# Patient Record
Sex: MALE | Race: WHITE | HISPANIC OR LATINO | Employment: OTHER | URBAN - METROPOLITAN AREA
[De-identification: names, ages, dates, MRNs, and addresses within clinical notes are randomized per-mention and may not be internally consistent; named-entity substitution may affect disease eponyms.]

---

## 2022-08-01 ENCOUNTER — APPOINTMENT (OUTPATIENT)
Dept: GENERAL RADIOLOGY | Facility: HOSPITAL | Age: 27
End: 2022-08-01

## 2022-08-01 ENCOUNTER — HOSPITAL ENCOUNTER (EMERGENCY)
Facility: HOSPITAL | Age: 27
Discharge: HOME | End: 2022-08-01
Attending: EMERGENCY MEDICINE | Admitting: EMERGENCY MEDICINE

## 2022-08-01 VITALS
BODY MASS INDEX: 22.2 KG/M2 | DIASTOLIC BLOOD PRESSURE: 78 MMHG | HEIGHT: 64 IN | OXYGEN SATURATION: 99 % | HEART RATE: 65 BPM | TEMPERATURE: 99.1 F | RESPIRATION RATE: 16 BRPM | WEIGHT: 130 LBS | SYSTOLIC BLOOD PRESSURE: 137 MMHG

## 2022-08-01 DIAGNOSIS — S61.011A LACERATION OF RIGHT THUMB WITHOUT FOREIGN BODY WITHOUT DAMAGE TO NAIL, INITIAL ENCOUNTER: Primary | ICD-10-CM

## 2022-08-01 PROCEDURE — 99283 EMERGENCY DEPT VISIT LOW MDM: CPT

## 2022-08-01 PROCEDURE — 73140 X-RAY EXAM OF FINGER(S): CPT

## 2022-08-01 PROCEDURE — 90471 IMMUNIZATION ADMIN: CPT

## 2022-08-01 PROCEDURE — 25010000002 TETANUS-DIPHTH-ACELL PERTUSSIS 5-2.5-18.5 LF-MCG/0.5 SUSPENSION PREFILLED SYRINGE

## 2022-08-01 PROCEDURE — 90715 TDAP VACCINE 7 YRS/> IM: CPT

## 2022-08-01 RX ORDER — BACITRACIN ZINC 500 [USP'U]/G
1 OINTMENT TOPICAL ONCE
Status: COMPLETED | OUTPATIENT
Start: 2022-08-01 | End: 2022-08-01

## 2022-08-01 RX ORDER — BACITRACIN, NEOMYCIN, POLYMYXIN B 400; 3.5; 5 [USP'U]/G; MG/G; [USP'U]/G
1 OINTMENT TOPICAL 2 TIMES DAILY
Qty: 14 G | Refills: 0 | Status: SHIPPED | OUTPATIENT
Start: 2022-08-01 | End: 2022-08-08

## 2022-08-01 RX ORDER — LIDOCAINE HYDROCHLORIDE 10 MG/ML
5 INJECTION, SOLUTION EPIDURAL; INFILTRATION; INTRACAUDAL; PERINEURAL ONCE
Status: COMPLETED | OUTPATIENT
Start: 2022-08-01 | End: 2022-08-01

## 2022-08-01 RX ORDER — CEPHALEXIN 500 MG/1
500 CAPSULE ORAL ONCE
Status: COMPLETED | OUTPATIENT
Start: 2022-08-01 | End: 2022-08-01

## 2022-08-01 RX ORDER — CEPHALEXIN 500 MG/1
500 CAPSULE ORAL 2 TIMES DAILY
Qty: 14 CAPSULE | Refills: 0 | Status: SHIPPED | OUTPATIENT
Start: 2022-08-01 | End: 2022-08-08

## 2022-08-01 RX ADMIN — TETANUS TOXOID, REDUCED DIPHTHERIA TOXOID AND ACELLULAR PERTUSSIS VACCINE, ADSORBED 0.5 ML: 5; 2.5; 8; 8; 2.5 SUSPENSION INTRAMUSCULAR at 18:16

## 2022-08-01 RX ADMIN — CEPHALEXIN 500 MG: 500 CAPSULE ORAL at 19:30

## 2022-08-01 RX ADMIN — LIDOCAINE HYDROCHLORIDE 5 ML: 10 INJECTION, SOLUTION EPIDURAL; INFILTRATION; INTRACAUDAL; PERINEURAL at 18:17

## 2022-08-01 RX ADMIN — BACITRACIN ZINC 1 APPLICATION: 500 OINTMENT TOPICAL at 19:31

## 2022-08-01 NOTE — ED PROVIDER NOTES
EMERGENCY DEPARTMENT ENCOUNTER      Room Number: 14/14    History is provided by the patient, translation services needed    HPI:    Chief complaint: Laceration    Location: Right thumb    Quality/Severity:  10/10    Timing/Duration: 1 hour prior to arrival    Modifying Factors: None    Associated Symptoms: None    Narrative: Pt is a 26 y.o. male who presents complaining of a laceration to his right thumb x1 hour prior to arrival.  Patient advises that he cut his thumb with a pocket knife.  He is unsure if he is up-to-date on his tetanus vaccine.  Patient denies any other injuries.      PMD: Provider, No Known    REVIEW OF SYSTEMS  Review of Systems   Constitutional: Negative for fever.   HENT: Negative for congestion.    Respiratory: Negative for cough and shortness of breath.    Cardiovascular: Negative for chest pain.   Gastrointestinal: Negative for abdominal pain, nausea and vomiting.   Musculoskeletal: Negative for gait problem.   Skin: Positive for wound (Laceration to right thumb). Negative for color change, pallor and rash.   Neurological: Negative for dizziness, weakness and headaches.   Psychiatric/Behavioral: Negative for confusion. The patient is not nervous/anxious.          PAST MEDICAL HISTORY  Active Ambulatory Problems     Diagnosis Date Noted   • No Active Ambulatory Problems     Resolved Ambulatory Problems     Diagnosis Date Noted   • No Resolved Ambulatory Problems     No Additional Past Medical History       PAST SURGICAL HISTORY  No past surgical history on file.    FAMILY HISTORY  No family history on file.    SOCIAL HISTORY  Social History     Socioeconomic History   • Marital status: Single       ALLERGIES  Patient has no known allergies.      Current Facility-Administered Medications:   •  bacitracin ointment 1 application, 1 application, Topical, Once, Justa Juarez PA-C  •  cephalexin (KEFLEX) capsule 500 mg, 500 mg, Oral, Once, Justa Juarez PA-C    Current Outpatient Medications:    •  cephalexin (KEFLEX) 500 MG capsule, Take 1 capsule by mouth 2 (Two) Times a Day for 7 days., Disp: 14 capsule, Rfl: 0  •  neomycin-bacitracin-polymyxin (NEOSPORIN) 400-5-5000 ointment, Apply 1 application topically to the appropriate area as directed 2 (Two) Times a Day for 7 days., Disp: 14 g, Rfl: 0    PHYSICAL EXAM  ED Triage Vitals [08/01/22 1745]   Temp Heart Rate Resp BP SpO2   99.1 °F (37.3 °C) 65 16 137/78 99 %      Temp src Heart Rate Source Patient Position BP Location FiO2 (%)   -- -- -- -- --       Physical Exam  Vitals and nursing note reviewed.   Constitutional:       General: He is not in acute distress.     Appearance: Normal appearance. He is normal weight. He is not ill-appearing, toxic-appearing or diaphoretic.   HENT:      Head: Normocephalic and atraumatic.   Eyes:      Extraocular Movements: Extraocular movements intact.      Conjunctiva/sclera: Conjunctivae normal.      Pupils: Pupils are equal, round, and reactive to light.   Cardiovascular:      Rate and Rhythm: Normal rate and regular rhythm.      Heart sounds: Normal heart sounds.   Pulmonary:      Effort: Pulmonary effort is normal. No respiratory distress.      Breath sounds: Normal breath sounds.   Abdominal:      General: Bowel sounds are normal. There is no distension.      Palpations: Abdomen is soft.      Tenderness: There is no abdominal tenderness.   Musculoskeletal:         General: Tenderness (To right thumb) and signs of injury (Laceration to right thumb) present. No swelling or deformity. Normal range of motion.      Cervical back: Normal range of motion and neck supple.      Right lower leg: No edema.      Left lower leg: No edema.   Skin:     General: Skin is warm and dry.      Coloration: Skin is not jaundiced or pale.      Findings: No bruising, erythema, lesion or rash.   Neurological:      Mental Status: He is alert and oriented to person, place, and time.   Psychiatric:         Mood and Affect: Mood and affect  normal.           LAB RESULTS  Lab Results (last 24 hours)     ** No results found for the last 24 hours. **            RADIOLOGY  XR Finger 2+ View Right    Result Date: 8/1/2022  CR Fingers Min 2 Vws RT INDICATION: Laceration injury to tip of thumb COMPARISON: None available. FINDINGS: PA, lateral, and oblique views of the right thumb..  No fracture or dislocation.  No bone erosion or destruction.  No foreign body. Soft tissue defect at the tip of the thumb.     No acute bony injury or radiopaque foreign body at the level of the thumb. Soft tissue defect at the tip of the thumb consistent with laceration injury. Signer Name: HITESH SCHULTE MD  Signed: 8/1/2022 6:44 PM  Workstation Name: [facility]  [facility]      I ordered the above radiologic testing and reviewed the results    PROCEDURES  Procedures      PROGRESS AND CONSULTS  ED Course as of 08/01/22 1923   Mon Aug 01, 2022   1923 Laceration repair:  Discussed risks to include bleeding, infection, further tissue damage, benefits to include improved wound healing, and alternatives to include no closure, altered closure techniques with patient/parents of the patient/guardian.  Expressed verbal consent for procedure.  1.5 cm laceration located on the right thumb.  Wound is anesthetized with lidocaine 1%, cleansed with chlorhexidine, and irrigated copiously.  Wound explored.  Simple single layer laceration closed with 5-0 Ethilon in an interrupted fashion requiring 4 sutures.  Well-tolerated.  No immediate complications identified.  Reviewed wound care, follow-up for suture removal, and red flags which would indicate need for reevaluation of the wound.    [AH]      ED Course User Index  [AH] Justa Juarez PA-C           MEDICAL DECISION MAKING    MDM     My differential diagnosis of the upper extremity pain or injury includes but is not limited to contusions of the shoulder, forearm, arm, wrist, elbow or hand, dislocations of shoulder,  elbow, wrist, digits, nursemaid's elbow (age-appropriate), shoulder sprain, elbow sprain, wrist sprain, digit sprain, shoulder strain, arm strain, forearm strain, elbow strain, wrist strain, hand sprain, digit strain, lacerations of the upper extremity, fractures both closed and open of clavicle, scapula, humerus, radius, ulna, bones of the wrist, hands and digits, cellulitis or abscess, cervical radiculopathy, radial nerve palsy, neurogenic upper extremity pain, compartment syndrome.  DIAGNOSIS  Final diagnoses:   Laceration of right thumb without foreign body without damage to nail, initial encounter       Latest Documented Vital Signs:  As of 19:23 EDT  BP- 137/78 HR- 65 Temp- 99.1 °F (37.3 °C) O2 sat- 99%    DISPOSITION  Pt discharged    Discussed pertinent findings with the patient/family.  Patient/Family voiced understanding of need to follow-up for recheck and further testing as needed.  Return to the Emergency Department warnings were given.         Medication List      New Prescriptions    cephalexin 500 MG capsule  Commonly known as: KEFLEX  Take 1 capsule by mouth 2 (Two) Times a Day for 7 days.     neomycin-bacitracin-polymyxin 400-5-5000 ointment  Commonly known as: NEOSPORIN  Apply 1 application topically to the appropriate area as directed 2 (Two) Times a Day for 7 days.           Where to Get Your Medications      You can get these medications from any pharmacy    Bring a paper prescription for each of these medications  · cephalexin 500 MG capsule  · neomycin-bacitracin-polymyxin 400-5-5000 ointment              Follow-up Information     Provider, No Known. Call today.    Why: to schedule follow up  Contact information:  [facility] [address] [phone]             Go to  [facility] Emergency Department.    Specialty: Emergency Medicine  Why: If symptoms worsen  Contact information:  [address] [place] [id number] [phone]                          Dictated utilizing Dragon dictation     Justa Juarez PA-C  08/01/22 1920       Justa Juarez PA-C  08/01/22 1923

## 2022-08-01 NOTE — DISCHARGE INSTRUCTIONS
Keep wound clean and dry for 24 hours.  Then wash 3 times daily with antibacterial soap, pat dry and apply Neosporin or Bacitracin.  Continue until healed. Follow up with your PCP in 2-3 days for a wound check; in 7 days for suture removal. Sooner for signs of infection.  Return to ED for signs of infection, medical emergencies